# Patient Record
(demographics unavailable — no encounter records)

---

## 2018-11-20 NOTE — RAD
LUMBAR SPINE THREE VIEWS:

 

INDICATIONS:

Lumbar radiculopathy.

 

FINDINGS:

Three view lumbar spine series, including neutral and flexion and extension lateral views reveals no 
evidence of significant subluxation or discernible translational motion.  There is multilevel endplat
e degenerative change.  There is moderate disk space narrowing at L4-L5.  There is mild disk space na
rrowing at L5-S1.  Multiple degenerative facet hypertrophy and sclerosis is present.

 

IMPRESSION:

No abnormal translational motion or significant listhesis on the provided lateral views of the lumbar
 spine.

 

POS: [facility]

## 2018-11-20 NOTE — RAD
TWO VIEW RIGHT HIP SERIES:

 

Indication: Pain. 

 

FINDINGS: 

There is no evidence of fracture or dislocation. Mild osteoarthritis. 

 

IMPRESSION: 

No acute osseous abnormality of the right hip. 

 

POS: [facility]

## 2018-11-20 NOTE — MRI
LUMBAR SPINE MRI NONCONTRAST:

11/20/18

 

INDICATION:

Lumbar radiculopathy.

 

FINDINGS:  

There is normal morphology. Conus medullaris which terminates at the L1-2 level. There is Modic type 
I degenerative process of the end plates of L4 and L5 with associated disc space narrowing. 

 

There is no significant central canal or neural foraminal stenosis at L1-2 or L2-3 levels. 

 

L3-4: Mild ventral thecal sac effacement due to broad based disc osteophyte without high grade femora
l stenosis.

 

L4-5: Broad based disc osteophyte with mild effacement of ventral thecal sac. There is moderate to se
bonita right and mild left foraminal stenosis. 

 

L5-S1: Mild disc osteophyte effaces the ventral thecal sac without significant central canal stenosis
. there is moderate left and mild to moderate right neural foraminal stenosis.

 

Multilevel degenerative bilateral facet hypertrophy is present, mild to moderate in degree.

 

IMPRESSION:  

Degenerative changes of the lumbar spine at the L4-5 and L5-S1 levels, as discussed above. 

 

POS: [facility]